# Patient Record
Sex: FEMALE | Race: WHITE | NOT HISPANIC OR LATINO
[De-identification: names, ages, dates, MRNs, and addresses within clinical notes are randomized per-mention and may not be internally consistent; named-entity substitution may affect disease eponyms.]

---

## 2022-07-22 PROBLEM — Z00.00 ENCOUNTER FOR PREVENTIVE HEALTH EXAMINATION: Status: ACTIVE | Noted: 2022-07-22

## 2022-09-02 ENCOUNTER — APPOINTMENT (OUTPATIENT)
Dept: ORTHOPEDIC SURGERY | Facility: CLINIC | Age: 72
End: 2022-09-02

## 2022-09-02 VITALS — WEIGHT: 175 LBS | HEIGHT: 63 IN | BODY MASS INDEX: 31.01 KG/M2

## 2022-09-02 DIAGNOSIS — Z96.641 PRESENCE OF RIGHT ARTIFICIAL HIP JOINT: ICD-10-CM

## 2022-09-02 DIAGNOSIS — Z86.79 PERSONAL HISTORY OF OTHER DISEASES OF THE CIRCULATORY SYSTEM: ICD-10-CM

## 2022-09-02 DIAGNOSIS — M51.36 OTHER INTERVERTEBRAL DISC DEGENERATION, LUMBAR REGION: ICD-10-CM

## 2022-09-02 DIAGNOSIS — Z96.642 PRESENCE OF LEFT ARTIFICIAL HIP JOINT: ICD-10-CM

## 2022-09-02 DIAGNOSIS — Z86.39 PERSONAL HISTORY OF OTHER ENDOCRINE, NUTRITIONAL AND METABOLIC DISEASE: ICD-10-CM

## 2022-09-02 DIAGNOSIS — M62.830 MUSCLE SPASM OF BACK: ICD-10-CM

## 2022-09-02 PROCEDURE — 73503 X-RAY EXAM HIP UNI 4/> VIEWS: CPT

## 2022-09-02 PROCEDURE — 99214 OFFICE O/P EST MOD 30 MIN: CPT

## 2022-09-02 PROCEDURE — 72100 X-RAY EXAM L-S SPINE 2/3 VWS: CPT

## 2022-09-02 NOTE — HISTORY OF PRESENT ILLNESS
[5] : 5 [0] : 0 [Sharp] : sharp [Shooting] : shooting [Rest] : rest [Meds] : meds [de-identified] : 9/2/22: 72yo F with bilateral L>R buttock pain for the past 3 months. Denies groin pain. Known to Dr. Delong for left PATRICK 2015, right PATRICK 2017. Admits to intermittent spasm. Denies radic, N/T, weakness, b/b incontinence. Taking tramadol but is slowing down with this as it is giving her headaches. Has done LESI in the past; states she had been dropped by the staff while being transferred to recovery. \par \par Prev. doc:\par 3/5/19: Patient known to me for left PATRICK 2015, right PATRICK 2017. Started having left hip pain x 1 week, no specific injury. Doing a lot of house work recently. [] : no [FreeTextEntry1] : bilateral hips  [FreeTextEntry5] : pt has been seen for her left hip before but recently felt the pain go down both her hips. pt has hx of surgery to both her hips [FreeTextEntry7] : from her lower back and into her hips  [FreeTextEntry9] : tramadol  [de-identified] : movement  [de-identified] : 2019 [de-identified] :   [de-identified] : left PATRICK 2015 and right PATRICK 2017 [de-identified] : 2019

## 2022-09-02 NOTE — ASSESSMENT
[FreeTextEntry1] : 3/5/19: Adv DDD mult levels has had LESI in past with min help - she has cyclobenprene already - we will start with Celebrex as well - this is all back issue no hip pain -\par \par 9/2/22: No sig progression of her Lumbar DDD on today's XR. Components well fixed of bilateral PATRICK. Informed her that this is a back issue rather than a hip issue. Discussed anti-inflammatories, muscle relaxer, PT/HEP, and LESIs. Discussed medical marijuana. Would proceed with PT/HEP. Will also obtain MRI L-spine and refer to Dr. Suarez

## 2022-09-02 NOTE — DISCUSSION/SUMMARY
[de-identified] : The patient was advised of the diagnosis.  The natural history of the pathology was explained in full to the patient in layman's terms. All questions were answered.  The risks and benefits of surgical and non-surgical treatment alternatives were explained in full to the patient.\par \par \par Progress note completed by Amanda Parikh PA-C.\par The documentation recorded by the PA accurately reflects the service I personally performed and the decisions made by me. -Dr. Delong

## 2023-05-03 NOTE — IMAGING
Left detailed message with information that was sent in packet to patient  Waiting therapies and w/c evaluation to be scheduled  SW remains available  [Facet arthropathy] : Facet arthropathy [Disc space narrowing] : Disc space narrowing [Bilateral] : hip with pelvis bilaterally [Components well fixed, in good position] : Components well fixed, in good position [de-identified] : LEFT HIP\par (-) Groin tenderness\par (-) Greater troch bursa tenderness\par + Buttock tenderness \par NVI\par \par RIGHT HIP\par (-) Groin tenderness\par (-) Greater troch bursa tenderness\par + Buttock tenderness \par NVI\par \par L-SPINE\par Stiffness in all ROM\par Bilateral lumbar paraspinal tenderness

## 2024-06-25 ENCOUNTER — APPOINTMENT (OUTPATIENT)
Dept: ORTHOPEDIC SURGERY | Facility: CLINIC | Age: 74
End: 2024-06-25

## 2024-06-25 DIAGNOSIS — M17.11 UNILATERAL PRIMARY OSTEOARTHRITIS, RIGHT KNEE: ICD-10-CM

## 2024-06-25 PROCEDURE — 99214 OFFICE O/P EST MOD 30 MIN: CPT | Mod: 25

## 2024-06-25 PROCEDURE — 73562 X-RAY EXAM OF KNEE 3: CPT | Mod: RT

## 2024-06-25 PROCEDURE — 20611 DRAIN/INJ JOINT/BURSA W/US: CPT | Mod: RT

## 2024-06-25 RX ORDER — MELOXICAM 15 MG/1
15 TABLET ORAL
Qty: 30 | Refills: 1 | Status: ACTIVE | COMMUNITY
Start: 2024-06-25 | End: 1900-01-01

## 2024-08-05 ENCOUNTER — APPOINTMENT (OUTPATIENT)
Dept: ORTHOPEDIC SURGERY | Facility: CLINIC | Age: 74
End: 2024-08-05

## 2024-08-05 PROCEDURE — 99214 OFFICE O/P EST MOD 30 MIN: CPT

## 2024-08-05 NOTE — IMAGING
[Right] : right knee [AP] : anteroposterior [Lateral] : lateral [[place]] : [place] [de-identified] : LEFT HIP\par  (-) Groin tenderness\par  (-) Greater troch bursa tenderness\par  + Buttock tenderness \par  NVI\par  \par  RIGHT HIP\par  (-) Groin tenderness\par  (-) Greater troch bursa tenderness\par  + Buttock tenderness \par  NVI\par  \par  L-SPINE\par  Stiffness in all ROM\par  Bilateral lumbar paraspinal tenderness  [FreeTextEntry9] : Moderate OA

## 2024-08-05 NOTE — ASSESSMENT
[FreeTextEntry1] : 3/5/19: Adv DDD mult levels has had LESI in past with min help - she has cyclobenprene already - we will start with Celebrex as well - this is all back issue no hip pain - 9/2/22: No sig progression of her Lumbar DDD on today's XR. Components well fixed of bilateral PATRICK. Informed her that this is a back issue rather than a hip issue. Discussed anti-inflammatories, muscle relaxer, PT/HEP, and LESIs. Discussed medical marijuana. Would proceed with PT/HEP. Will also obtain MRI L-spine and refer to Dr. Suarez  6/25/24: Moderate right knee OA. Discussed anti-inflammatories, PT/HEP, CSI, gel injections, and briefly TKA. She is well informed and would like to proceed with Mobic and R knee Zilretta today, tolerated well. Wants to hold off on PT for now. f/up 6 weeks.   8/5/24: Moderate RT knee OA. Zilretta at last visit provided good relief, pain has significantly improved. Discussed she can repeat these injections every 3 months, currently she is doing very well. She has not yet tried Meloxicam but discussed she can take this or other NSAIDs/OTC and her occasional use of tramadol meds as needed. Briefly discussed surgical options for the future- explained she would likely not benefit from arthroscopy and the only surgical intervention that would help her is TKA. Follow up as symptoms dictate.

## 2024-08-05 NOTE — HISTORY OF PRESENT ILLNESS
[1] : 2 [de-identified] : 8/5/24: Pt with moderate RT knee OA. Zilretta at last visit provided sig relief. Has not tried Meloxicam. Overall very min pain, now able to cross her legs. Ambulates without assistance.    Prev. doc: 3/5/19: Patient known to me for left PATRICK 2015, right PATRICK 2017. Started having left hip pain x 1 week, no specific injury. Doing a lot of house work recently. 9/2/22: 70yo F with bilateral L>R buttock pain for the past 3 months. Denies groin pain. Known to Dr. Delong for left PATRICK 2015, right PATRICK 2017. Admits to intermittent spasm. Denies radic, N/T, weakness, b/b incontinence. Taking tramadol but is slowing down with this as it is giving her headaches. Has done LESI in the past; states she had been dropped by the staff while being transferred to recovery.  6/25/24: 72yo F with right knee pain for the past ~3 weeks with no injury. Hx of R knee scope 07/2006 by Dr. Jama. Having pain with crossing her legs. Working parttime at [employer] (). States her hips are doing well.   [] : no [FreeTextEntry1] : Right knee  [FreeTextEntry5] : Felt relief from zilretta ( 06/25/24). Slight numbing on lateral side.

## 2024-08-05 NOTE — DISCUSSION/SUMMARY
[de-identified] : The natural progression of Osteoarthritis was explained to the patient.  We discussed the possible treatment options from conservative to operative.  These included NSAIDS, Glucosamine and Chondrotin sulfate, and Physical Therapy as well different types of injections.  We also discussed that at some point they may progress to needed a TKA.  Information and pamphlets were given when appropriate.  Entered by Karen Painter acting as a scribe.

## 2025-07-21 ENCOUNTER — APPOINTMENT (OUTPATIENT)
Dept: ORTHOPEDIC SURGERY | Facility: CLINIC | Age: 75
End: 2025-07-21
Payer: MEDICARE

## 2025-07-21 DIAGNOSIS — M17.11 UNILATERAL PRIMARY OSTEOARTHRITIS, RIGHT KNEE: ICD-10-CM

## 2025-07-21 PROCEDURE — 20610 DRAIN/INJ JOINT/BURSA W/O US: CPT | Mod: RT

## 2025-07-21 PROCEDURE — 99213 OFFICE O/P EST LOW 20 MIN: CPT | Mod: 25

## 2025-07-21 PROCEDURE — 99214 OFFICE O/P EST MOD 30 MIN: CPT | Mod: 25

## 2025-07-21 PROCEDURE — 73562 X-RAY EXAM OF KNEE 3: CPT | Mod: RT

## 2025-07-21 RX ORDER — SEMAGLUTIDE 0.68 MG/ML
INJECTION, SOLUTION SUBCUTANEOUS
Refills: 0 | Status: ACTIVE | COMMUNITY

## 2025-07-21 RX ORDER — CYCLOBENZAPRINE HYDROCHLORIDE 5 MG/1
5 TABLET, FILM COATED ORAL
Refills: 0 | Status: ACTIVE | COMMUNITY

## 2025-07-21 RX ORDER — KRILL/OM-3/DHA/EPA/PHOSPHO/AST 1000-230MG
CAPSULE ORAL
Refills: 0 | Status: ACTIVE | COMMUNITY

## 2025-07-21 RX ORDER — ADALIMUMAB 80MG/0.8ML
KIT SUBCUTANEOUS
Refills: 0 | Status: ACTIVE | COMMUNITY

## 2025-07-21 RX ORDER — SIMVASTATIN 80 MG/1
TABLET, FILM COATED ORAL
Refills: 0 | Status: ACTIVE | COMMUNITY

## 2025-07-21 RX ORDER — TRAMADOL HYDROCHLORIDE 25 MG/1
25 TABLET, COATED ORAL
Refills: 0 | Status: ACTIVE | COMMUNITY

## 2025-07-21 RX ORDER — VERAPAMIL HYDROCHLORIDE 80 MG/1
TABLET ORAL
Refills: 0 | Status: ACTIVE | COMMUNITY

## 2025-07-21 RX ORDER — MELOXICAM 15 MG/1
15 TABLET ORAL
Qty: 30 | Refills: 1 | Status: ACTIVE | COMMUNITY
Start: 2025-07-21 | End: 1900-01-01